# Patient Record
Sex: FEMALE | URBAN - METROPOLITAN AREA
[De-identification: names, ages, dates, MRNs, and addresses within clinical notes are randomized per-mention and may not be internally consistent; named-entity substitution may affect disease eponyms.]

---

## 2018-09-10 ENCOUNTER — APPOINTMENT (OUTPATIENT)
Age: 43
Setting detail: DERMATOLOGY
End: 2018-09-11

## 2018-09-10 DIAGNOSIS — L21.8 OTHER SEBORRHEIC DERMATITIS: ICD-10-CM

## 2018-09-10 PROCEDURE — 87220 TISSUE EXAM FOR FUNGI: CPT

## 2018-09-10 PROCEDURE — OTHER PRESCRIPTION: OTHER

## 2018-09-10 PROCEDURE — OTHER COUNSELING: OTHER

## 2018-09-10 PROCEDURE — OTHER ORDER TESTS: OTHER

## 2018-09-10 PROCEDURE — OTHER ADDITIONAL NOTES: OTHER

## 2018-09-10 PROCEDURE — OTHER KOH PREP: OTHER

## 2018-09-10 PROCEDURE — 99202 OFFICE O/P NEW SF 15 MIN: CPT

## 2018-09-10 RX ORDER — CLOBETASOL PROPIONATE 0.5 MG/ML
SOLUTION TOPICAL
Qty: 1 | Refills: 0 | Status: ERX | COMMUNITY
Start: 2018-09-10

## 2018-09-10 RX ORDER — KETOCONAZOLE 20.5 MG/ML
SHAMPOO, SUSPENSION TOPICAL
Qty: 1 | Refills: 1 | Status: ERX | COMMUNITY
Start: 2018-09-10

## 2018-09-10 ASSESSMENT — LOCATION SIMPLE DESCRIPTION DERM
LOCATION SIMPLE: SCALP
LOCATION SIMPLE: POSTERIOR SCALP

## 2018-09-10 ASSESSMENT — LOCATION ZONE DERM: LOCATION ZONE: SCALP

## 2018-09-10 ASSESSMENT — LOCATION DETAILED DESCRIPTION DERM
LOCATION DETAILED: LEFT SUPERIOR PARIETAL SCALP
LOCATION DETAILED: MID-OCCIPITAL SCALP

## 2018-09-10 NOTE — PROCEDURE: ADDITIONAL NOTES
Detail Level: Simple
Additional Notes: Will r/o tinea capitis. Previously treated with course of terbinafine by pcp.\\nHair growth is present in area. Primarily erythema with slight fine white surface scale. \\nNo hyphea observed on KOH prep.\\nWill send scalp scrapping for fungal culture to confirm tinea capitis not present.\\nInstructed to discontinue triple antibiotic cream to area since may be causing secondary scalp irritation.\\nminimize scratching using cool pack of peas.\\nPatient denies other family members with similar scalp conditions. Does not have pets or young children in the home.\\nMay continue selsun blue on days not using ketoconazole shampoo. \\nIf not improving at follow-up consider biopsy

## 2018-09-10 NOTE — HPI: ITCHING (SCALP)
How Did The Scalp Condition Occur?: gradual in onset  (over a period of years)
How Severe Is The Scalp Condition?: moderate
Additional History: Patient states that she did go to family doctor and they told her to use otc selsum blue shampoo and gave her lamilsil pills to take daily for two weeks. Patient states that did not help so family doctor referred her to us.

## 2018-10-08 ENCOUNTER — APPOINTMENT (OUTPATIENT)
Age: 43
Setting detail: DERMATOLOGY
End: 2018-10-09

## 2018-10-08 DIAGNOSIS — L21.8 OTHER SEBORRHEIC DERMATITIS: ICD-10-CM

## 2018-10-08 DIAGNOSIS — L30.9 DERMATITIS, UNSPECIFIED: ICD-10-CM

## 2018-10-08 PROCEDURE — OTHER FOLLOW UP ORDERS: OTHER

## 2018-10-08 PROCEDURE — OTHER COUNSELING: OTHER

## 2018-10-08 PROCEDURE — 99213 OFFICE O/P EST LOW 20 MIN: CPT

## 2018-10-08 PROCEDURE — OTHER ADDITIONAL NOTES: OTHER

## 2018-10-08 PROCEDURE — OTHER PRESCRIPTION MEDICATION MANAGEMENT: OTHER

## 2018-10-08 ASSESSMENT — LOCATION DETAILED DESCRIPTION DERM
LOCATION DETAILED: MID-OCCIPITAL SCALP
LOCATION DETAILED: LEFT SUPERIOR PARIETAL SCALP

## 2018-10-08 ASSESSMENT — LOCATION SIMPLE DESCRIPTION DERM
LOCATION SIMPLE: SCALP
LOCATION SIMPLE: POSTERIOR SCALP

## 2018-10-08 ASSESSMENT — LOCATION ZONE DERM: LOCATION ZONE: SCALP

## 2018-10-08 NOTE — PROCEDURE: PRESCRIPTION MEDICATION MANAGEMENT
Detail Level: Detailed
Continue Regimen: Ketoconazole shampoo and Clobetasol. Patient may use Clobetasol once daily for a week again, then drop  decrease to twice a week again
Plan: Minimize scratching. use cool pack of peas rather than scratching. keep nails short
Render In Strict Bullet Format?: No

## 2018-10-08 NOTE — PROCEDURE: ADDITIONAL NOTES
Additional Notes: patient reports aggressive scratching. states that area initially drastically improved with topical steroids, but then recurred.\\nShe would like more definitive diagnosis.\\nRecommended biopsy. Patient declined today due to upcoming travel, but will schedule in the next few weeks.\\nfungal culture has no growth to date.\\nphoto taken
Detail Level: Zone

## 2018-10-08 NOTE — PROCEDURE: FOLLOW UP ORDERS
Detail Level: Detailed
Follow-Up Preamble: The following orders were made during the visit: punch bx x2

## 2018-10-15 RX ORDER — KETOCONAZOLE 20.5 MG/ML
SHAMPOO, SUSPENSION TOPICAL
Qty: 1 | Refills: 1 | Status: ERX

## 2018-10-15 RX ORDER — CLOBETASOL PROPIONATE 0.5 MG/ML
SOLUTION TOPICAL
Qty: 1 | Refills: 0 | Status: ERX

## 2018-10-31 ENCOUNTER — APPOINTMENT (OUTPATIENT)
Age: 43
Setting detail: DERMATOLOGY
End: 2018-11-01

## 2018-10-31 DIAGNOSIS — L30.9 DERMATITIS, UNSPECIFIED: ICD-10-CM

## 2018-10-31 PROCEDURE — 11101: CPT

## 2018-10-31 PROCEDURE — 11100: CPT

## 2018-10-31 PROCEDURE — OTHER BIOPSY BY PUNCH METHOD: OTHER

## 2018-10-31 ASSESSMENT — LOCATION DETAILED DESCRIPTION DERM
LOCATION DETAILED: MID-OCCIPITAL SCALP
LOCATION DETAILED: RIGHT INFERIOR OCCIPITAL SCALP

## 2018-10-31 ASSESSMENT — LOCATION ZONE DERM: LOCATION ZONE: SCALP

## 2018-10-31 ASSESSMENT — LOCATION SIMPLE DESCRIPTION DERM: LOCATION SIMPLE: POSTERIOR SCALP

## 2018-10-31 NOTE — PROCEDURE: BIOPSY BY PUNCH METHOD
Consent: Written consent was obtained and risks were reviewed including but not limited to scarring, infection, bleeding, scabbing, incomplete removal, nerve damage and allergy to anesthesia.
Hemostasis: None
Home Suture Removal Text: Patient was provided a home suture removal kit and will remove their sutures at home.  If they have any questions or difficulties they will call the office.
Billing Type: Third-Party Bill
Was A Bandage Applied: Yes
Post-Care Instructions: I reviewed with the patient in detail post-care instructions. Patient is to keep the biopsy site dry overnight, and then apply bacitracin twice daily until healed. Patient may apply hydrogen peroxide soaks to remove any crusting.
X Size Of Lesion In Cm (Optional): 0
Bill For Surgical Tray: no
Epidermal Sutures: 4-0 Nylon
Notification Instructions: Patient will be notified of biopsy results. However, patient instructed to call the office if not contacted within 2 weeks.
Anesthesia Volume In Cc (Will Not Render If 0): 1
Biopsy Type: H and E
Punch Size In Mm: 4
Suture Removal: 14 days
Anesthesia Type: 2% lidocaine with epinephrine
Wound Care: No ointment
Detail Level: Detailed
Dressing: pressure dressing

## 2018-11-14 ENCOUNTER — APPOINTMENT (OUTPATIENT)
Age: 43
Setting detail: DERMATOLOGY
End: 2018-11-19

## 2018-11-14 DIAGNOSIS — Z48.02 ENCOUNTER FOR REMOVAL OF SUTURES: ICD-10-CM

## 2018-11-14 DIAGNOSIS — L21.8 OTHER SEBORRHEIC DERMATITIS: ICD-10-CM

## 2018-11-14 PROCEDURE — OTHER COUNSELING: OTHER

## 2018-11-14 PROCEDURE — 99213 OFFICE O/P EST LOW 20 MIN: CPT

## 2018-11-14 PROCEDURE — OTHER PRESCRIPTION: OTHER

## 2018-11-14 PROCEDURE — OTHER PRESCRIPTION MEDICATION MANAGEMENT: OTHER

## 2018-11-14 PROCEDURE — OTHER SUTURE REMOVAL (NO GLOBAL PERIOD): OTHER

## 2018-11-14 RX ORDER — KETOCONAZOLE 20.5 MG/ML
SHAMPOO, SUSPENSION TOPICAL
Qty: 2 | Refills: 8 | Status: ERX

## 2018-11-14 RX ORDER — CLOBETASOL PROPIONATE 0.46 MG/ML
SOLUTION TOPICAL
Qty: 1 | Refills: 2 | Status: ERX

## 2018-11-14 ASSESSMENT — LOCATION SIMPLE DESCRIPTION DERM: LOCATION SIMPLE: POSTERIOR SCALP

## 2018-11-14 ASSESSMENT — LOCATION ZONE DERM: LOCATION ZONE: SCALP

## 2018-11-14 ASSESSMENT — LOCATION DETAILED DESCRIPTION DERM
LOCATION DETAILED: RIGHT INFERIOR OCCIPITAL SCALP
LOCATION DETAILED: MID-OCCIPITAL SCALP

## 2018-11-14 NOTE — PROCEDURE: PRESCRIPTION MEDICATION MANAGEMENT
Modify Regimen: restart Clobetasol 0.05% scalp solution when flared
Render In Strict Bullet Format?: No
Detail Level: Zone
Otc Regimen: Tea tree oil, DA shampoo, selsun blue, head and shoulders shampoos
Continue Regimen: Ketoconazole 2% shampoo \\n